# Patient Record
Sex: FEMALE | Race: WHITE | Employment: FULL TIME | URBAN - METROPOLITAN AREA
[De-identification: names, ages, dates, MRNs, and addresses within clinical notes are randomized per-mention and may not be internally consistent; named-entity substitution may affect disease eponyms.]

---

## 2017-08-25 ENCOUNTER — OFFICE VISIT (OUTPATIENT)
Dept: FAMILY MEDICINE | Facility: CLINIC | Age: 77
End: 2017-08-25

## 2017-08-25 VITALS
DIASTOLIC BLOOD PRESSURE: 83 MMHG | BODY MASS INDEX: 24.41 KG/M2 | WEIGHT: 149 LBS | OXYGEN SATURATION: 96 % | SYSTOLIC BLOOD PRESSURE: 151 MMHG | HEART RATE: 75 BPM

## 2017-08-25 DIAGNOSIS — I10 BENIGN ESSENTIAL HYPERTENSION: ICD-10-CM

## 2017-08-25 DIAGNOSIS — M54.6 ACUTE RIGHT-SIDED THORACIC BACK PAIN: Primary | ICD-10-CM

## 2017-08-25 RX ORDER — LISINOPRIL 10 MG/1
10 TABLET ORAL DAILY
Qty: 90 TABLET | Refills: 3 | Status: SHIPPED | OUTPATIENT
Start: 2017-08-25

## 2017-08-25 RX ORDER — LISINOPRIL 10 MG/1
10 TABLET ORAL DAILY
Qty: 60 TABLET | Refills: 1 | Status: SHIPPED | OUTPATIENT
Start: 2017-08-25

## 2017-08-25 ASSESSMENT — PAIN SCALES - GENERAL: PAINLEVEL: MILD PAIN (2)
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
## 2017-08-25 NOTE — MR AVS SNAPSHOT
After Visit Summary   2017    Yashira Matson    MRN: [id number]           Patient Information     Date Of Birth          1940        Visit Information        Provider Department      2017 12:00 PM Viola Caputo MD [facility]        Today's Diagnoses     Benign essential hypertension    -  1    Acute right-sided thoracic back pain           Follow-ups after your visit        Who to contact     Please call your clinic at [phone] to:    Ask questions about your health    Make or cancel appointments    Discuss your medicines    Learn about your test results    Speak to your doctor   If you have compliments or concerns about an experience at your clinic, or if you wish to file a complaint, please contact [facility] Patient Relations at [phone] or email us at [email or web address]         Additional Information About Your Visit        MyChart Information     TravelCLICKt is an electronic gateway that provides easy, online access to your medical records. With [employer], you can request a clinic appointment, read your test results, renew a prescription or communicate with your care team.     To sign up for TravelCLICKt visit the website at [email or web address]   You will be asked to enter the access code listed below, as well as some personal information. Please follow the directions to create your username and password.     Your access code is: [email or web address]  Expires: 2017  1:03 PM     Your access code will  in 90 days. If you need help or a new code, please contact your [facility] or call [phone] for assistance.        Care EveryWhere ID     This is your Care EveryWhere ID. This could be used by other organizations to access your [facility] medical records  [id number]        Your Vitals Were     Pulse Pulse Oximetry BMI (Body Mass Index)             75 96% 24.41 kg/m2          Blood  Pressure from Last 3 Encounters:   08/25/17 151/83   07/01/16 167/89   06/30/16 187/82    Weight from Last 3 Encounters:   08/25/17 149 lb (67.6 kg)   07/01/16 140 lb (63.5 kg)   06/30/16 142 lb 6.4 oz (64.6 kg)              Today, you had the following     No orders found for display         Today's Medication Changes          These changes are accurate as of: 8/25/17  1:03 PM.  If you have any questions, ask your nurse or doctor.               These medicines have changed or have updated prescriptions.        Dose/Directions    * lisinopril 10 MG tablet   Commonly known as:  PRINIVIL/ZESTRIL   This may have changed:  Another medication with the same name was added. Make sure you understand how and when to take each.   Used for:  Benign essential hypertension   Changed by:  Viola Caputo MD        Dose:  10 mg   Take 1 tablet (10 mg) by mouth daily   Quantity:  90 tablet   Refills:  3       * lisinopril 10 MG tablet   Commonly known as:  PRINIVIL/ZESTRIL   This may have changed:  You were already taking a medication with the same name, and this prescription was added. Make sure you understand how and when to take each.   Used for:  Benign essential hypertension   Changed by:  Viola Caputo MD        Dose:  10 mg   Take 1 tablet (10 mg) by mouth daily   Quantity:  60 tablet   Refills:  1       * Notice:  This list has 2 medication(s) that are the same as other medications prescribed for you. Read the directions carefully, and ask your doctor or other care provider to review them with you.      Stop taking these medicines if you haven't already. Please contact your care team if you have questions.     erythromycin ophthalmic ointment   Commonly known as:  ROMYCIN   Stopped by:  Viola Caputo MD           HYDROcodone-acetaminophen 5-325 MG per tablet   Commonly known as:  NORCO   Stopped by:  Viola Caputo MD                Where to get your medicines      These medications were sent to  [id number] - [place], [place] - [address]  [address] [facility] [address]  [address] [facility] [address]     Phone:  [phone]     lisinopril 10 MG tablet         These medications were sent to [id number]  [place] [address] [facility] [address]  [address], [facility] [address]     Phone:  [phone]     lisinopril 10 MG tablet                Primary Care Provider Office Phone # Fax #    Viola Caputo [phone]       [address] [facility] [address]  [facility] [address]        Equal Access to Services     [place] SHARIF : Hadii aad ku hadasho Soomaali, waaxda luqadaha, qaybta kaalmada adeegyada, linda floresin pj oconnor. So [facility] [phone].    ATENCIÓN: Si habla español, tiene a denis disposición servicios gratuitos de asistencia lingüística. [facility] [phone].    We comply with applicable federal civil rights laws and [place] laws. We do not discriminate on the basis of race, color, national origin, age, disability sex, sexual orientation or gender identity.            Thank you!     Thank you for choosing [facility]  for your care. Our goal is always to provide you with excellent care. Hearing back from our patients is one way we can continue to improve our services. Please take a few minutes to complete the written survey that you may receive in the mail after your visit with us. Thank you!             Your Updated Medication List - Protect others around you: Learn how to safely use, store and throw away your medicines at www.disposemymeds.org.          This list is accurate as of: 8/25/17  1:03 PM.  Always use your most recent med list.                   Brand Name Dispense Instructions for use Diagnosis    B COMPLEX PLUS PO      Take  by mouth.        CO Q 10 PO      Take  by mouth.        fish oil-omega-3 fatty acids 1000 MG capsule      Take 2 g by mouth daily.        GLUCOSAMINE PO      Take  by mouth.        * lisinopril  10 MG tablet    PRINIVIL/ZESTRIL    90 tablet    Take 1 tablet (10 mg) by mouth daily    Benign essential hypertension       * lisinopril 10 MG tablet    PRINIVIL/ZESTRIL    60 tablet    Take 1 tablet (10 mg) by mouth daily    Benign essential hypertension       MULTI-VITAMIN PO      Take  by mouth.        UNABLE TO FIND      SAME 1 tab daily        VITAMIN B 12 PO           VITAMIN C CR PO      Take  by mouth.        * Notice:  This list has 2 medication(s) that are the same as other medications prescribed for you. Read the directions carefully, and ask your doctor or other care provider to review them with you.

## 2017-08-25 NOTE — NURSING NOTE
Chief Complaint   Patient presents with     Back Pain     lower right side     77 year old      Blood pressure (!) 174/91, pulse 75, weight 149 lb (67.6 kg), SpO2 96 %. Body mass index is 24.41 kg/(m^2).    Wt Readings from Last 2 Encounters:   08/25/17 149 lb (67.6 kg)   07/01/16 140 lb (63.5 kg)     BP Readings from Last 3 Encounters:   08/25/17 (!) 174/91   07/01/16 167/89   06/30/16 187/82       Patient Active Problem List   Diagnosis     Benign essential hypertension       Current Outpatient Prescriptions   Medication Sig Dispense Refill     Cyanocobalamin (VITAMIN B 12 PO)        UNABLE TO FIND SAME 1 tab daily       Multiple Vitamin (MULTI-VITAMIN PO) Take  by mouth.       B Complex-Folic Acid (B COMPLEX PLUS PO) Take  by mouth.       Coenzyme Q10 (CO Q 10 PO) Take  by mouth.       Ascorbic Acid (VITAMIN C CR PO) Take  by mouth.       fish oil-omega-3 fatty acids (FISH OIL) 1000 MG capsule Take 2 g by mouth daily.       GLUCOSAMINE PO Take  by mouth.         Social History   Substance Use Topics     Smoking status: Never Smoker     Smokeless tobacco: Never Used     Alcohol use Yes         Health Maintenance Due   Topic Date Due     TETANUS IMMUNIZATION (SYSTEM ASSIGNED)  08/09/1960     MAMMO SCREEN Q2 YR (SYSTEM ASSIGNED)  08/09/1992     ADVANCE DIRECTIVE PLANNING Q5 YRS  08/09/1997     FALL RISK ASSESSMENT  08/09/2007     DEXA SCAN SCREENING (SYSTEM ASSIGNED)  08/09/2007     PNEUMOCOCCAL (1 of 2 - PCV13) 08/09/2007     LIPID SCREEN Q5 YR FEMALE (SYSTEM ASSIGNED)  11/03/2015       DA AYOUB CMA  August 25, 2017 12:18 PM

## 2017-08-28 DIAGNOSIS — M54.6 RIGHT-SIDED THORACIC BACK PAIN, UNSPECIFIED CHRONICITY: ICD-10-CM

## 2017-08-28 DIAGNOSIS — R07.81 RIB PAIN ON RIGHT SIDE: Primary | ICD-10-CM

## 2017-08-30 ENCOUNTER — HOSPITAL ENCOUNTER (OUTPATIENT)
Dept: MRI IMAGING | Facility: CLINIC | Age: 77
Discharge: HOME | End: 2017-08-30
Attending: FAMILY MEDICINE | Admitting: FAMILY MEDICINE
Payer: MEDICARE

## 2017-08-30 DIAGNOSIS — M54.6 RIGHT-SIDED THORACIC BACK PAIN, UNSPECIFIED CHRONICITY: ICD-10-CM

## 2017-08-30 DIAGNOSIS — R07.81 RIB PAIN ON RIGHT SIDE: ICD-10-CM

## 2017-08-30 PROCEDURE — 72146 MRI CHEST SPINE W/O DYE: CPT

## 2020-02-24 ENCOUNTER — HEALTH MAINTENANCE LETTER (OUTPATIENT)
Age: 80
End: 2020-02-24

## 2020-09-04 ENCOUNTER — MEDICAL CORRESPONDENCE (OUTPATIENT)
Dept: FAMILY MEDICINE | Facility: CLINIC | Age: 80
End: 2020-09-04

## 2020-09-04 ENCOUNTER — TRANSFERRED RECORDS (OUTPATIENT)
Dept: HEALTH INFORMATION MANAGEMENT | Facility: CLINIC | Age: 80
End: 2020-09-04

## 2020-09-04 DIAGNOSIS — M54.6 ACUTE BILATERAL THORACIC BACK PAIN: Primary | ICD-10-CM

## 2020-12-13 ENCOUNTER — HEALTH MAINTENANCE LETTER (OUTPATIENT)
Age: 80
End: 2020-12-13

## 2021-04-17 ENCOUNTER — HEALTH MAINTENANCE LETTER (OUTPATIENT)
Age: 81
End: 2021-04-17

## 2021-09-26 ENCOUNTER — HEALTH MAINTENANCE LETTER (OUTPATIENT)
Age: 81
End: 2021-09-26

## 2022-05-08 ENCOUNTER — HEALTH MAINTENANCE LETTER (OUTPATIENT)
Age: 82
End: 2022-05-08

## 2023-04-23 ENCOUNTER — HEALTH MAINTENANCE LETTER (OUTPATIENT)
Age: 83
End: 2023-04-23